# Patient Record
Sex: FEMALE | Race: WHITE | Employment: FULL TIME | URBAN - NONMETROPOLITAN AREA
[De-identification: names, ages, dates, MRNs, and addresses within clinical notes are randomized per-mention and may not be internally consistent; named-entity substitution may affect disease eponyms.]

---

## 2017-02-20 ENCOUNTER — TELEPHONE (OUTPATIENT)
Dept: FAMILY MEDICINE CLINIC | Facility: CLINIC | Age: 37
End: 2017-02-20

## 2017-02-20 NOTE — TELEPHONE ENCOUNTER
Spoke with Enmanuel Garcia at [facility] who wanted to know if citalopram script could be changed to 90 day since dose increase was 3 times daily. Provided v/o to change to 90.  Enmanuel Garcia verified information provided

## 2017-02-21 NOTE — PROGRESS NOTES
Corey Crespo is a 42year old female. Patient presents with:  Medication Follow-Up: discuss medication. . room 5      HPI:   Here to discuss her citalopram dosing. Is working well but recently there have been a lot of issues.   First her father  in 122/80  09/08/16 : 102/60  03/09/16 : 122/78  11/02/15 : 122/64      Wt Readings from Last 6 Encounters:  02/20/17 : 179 lb 4 oz  12/05/16 : 190 lb 6 oz  10/03/16 : 177 lb  09/08/16 : 179 lb 6 oz  03/09/16 : 174 lb 9.6 oz  11/02/15 : 171 lb 2 oz      REVIE anything. Increase in meds can help, time will help           Return in about 4 weeks (around 3/20/2017) for medication review, discussion of dosage adjustment. No orders of the defined types were placed in this encounter.        No orders of the defi

## 2017-02-21 NOTE — PATIENT INSTRUCTIONS
Grief Reaction  Grief is the feeling that we all have when we lose someone or something that has been important in our life. Grief is an unavoidable and normal reaction to this loss, and can last from months to years.  The amount of time depends on differ · Consider joining a grief support group. Ask your doctor or our staff for information on how to find one in your area. · If you have been prescribed a medicine to help with your symptoms, take it only as directed. Do not use it with alcohol.   Follow-up c Maciel Howard will be given to you by the pharmacist with each prescription and refill. Be sure to read this information carefully each time. Talk to your pediatrician regarding the use of this medicine in children. Special care may be needed.   Ricardo · certain medicines for infections like chloroquine, clarithromycin, erythromycin, furazolidone, isoniazid, pentamidine  · certain medicines for migraine headaches like almotriptan, eletriptan, frovatriptan, naratriptan, rizatriptan, sumatriptan, zolmitrip Tell your doctor if your symptoms do not get better or if they get worse. Visit your doctor or health care professional for regular checks on your progress.  Because it may take several weeks to see the full effects of this medicine, it is important to cont

## 2017-04-04 ENCOUNTER — TELEPHONE (OUTPATIENT)
Dept: FAMILY MEDICINE CLINIC | Facility: CLINIC | Age: 37
End: 2017-04-04

## 2017-04-04 DIAGNOSIS — N64.4 BREAST PAIN, LEFT: Primary | ICD-10-CM

## 2017-04-07 ENCOUNTER — HOSPITAL ENCOUNTER (OUTPATIENT)
Dept: ULTRASOUND IMAGING | Age: 37
Discharge: HOME | End: 2017-04-07
Attending: FAMILY MEDICINE
Payer: COMMERCIAL

## 2017-04-07 ENCOUNTER — HOSPITAL ENCOUNTER (OUTPATIENT)
Dept: MAMMOGRAPHY | Age: 37
Discharge: HOME | End: 2017-04-07
Attending: FAMILY MEDICINE
Payer: COMMERCIAL

## 2017-04-07 DIAGNOSIS — N64.4 BREAST PAIN, LEFT: ICD-10-CM

## 2017-04-07 PROCEDURE — 76642 ULTRASOUND BREAST LIMITED: CPT

## 2017-04-07 PROCEDURE — 77062 BREAST TOMOSYNTHESIS BI: CPT

## 2017-04-07 PROCEDURE — 77066 DX MAMMO INCL CAD BI: CPT

## 2017-04-08 NOTE — PATIENT INSTRUCTIONS
Breast Anatomy    Breasts come in all shapes and sizes. But they all share the same features. You can learn about the parts, or anatomy, of your breasts. This will help you know what you're seeing and feeling.  Then you can learn what's normal for your br

## 2017-04-08 NOTE — PROGRESS NOTES
Sean Peraza is a 42year old female. Patient presents with:  Medication Follow-Up: f.up on meds. ...room 6  Breast Problem: LT breast has large dimple in it. Anjali Cunningham HPI:   Patient here for evaluation.   First of all she would like to review her current lb  02/20/17 : 179 lb 4 oz  12/05/16 : 190 lb 6 oz  10/03/16 : 177 lb  09/08/16 : 179 lb 6 oz  03/09/16 : 174 lb 9.6 oz      REVIEW OF SYSTEMS:   GENERAL HEALTH: feels well no complaints  SKIN: denies any unusual skin lesions or rashes  See hpi  MOOD  see ([id number])           Return in about 3 months (around 7/3/2017) for medication review, discussion of dosage adjustment, after results available from testing. No orders of the defined types were placed in this encounter.        No orders of the defined

## 2017-05-24 ENCOUNTER — PATIENT MESSAGE (OUTPATIENT)
Dept: FAMILY MEDICINE CLINIC | Facility: CLINIC | Age: 37
End: 2017-05-24

## 2017-05-24 NOTE — TELEPHONE ENCOUNTER
From: Lia Lu  To: Tammy Feng MD  Sent: 5/24/2017 6:42 AM CDT  Subject: Non-Urgent Medical Question    I'm having continued mild dizzyness. Brain floating sensation like when you've taken too much cold medicine. I'm not taking anything.  Been g

## 2017-05-25 ENCOUNTER — OFFICE VISIT (OUTPATIENT)
Dept: FAMILY MEDICINE CLINIC | Facility: CLINIC | Age: 37
End: 2017-05-25

## 2017-05-25 VITALS
SYSTOLIC BLOOD PRESSURE: 108 MMHG | TEMPERATURE: 99 F | BODY MASS INDEX: 28 KG/M2 | DIASTOLIC BLOOD PRESSURE: 66 MMHG | WEIGHT: 166.13 LBS

## 2017-05-25 DIAGNOSIS — R35.0 URINARY FREQUENCY: Primary | ICD-10-CM

## 2017-05-25 DIAGNOSIS — R42 VERTIGO: ICD-10-CM

## 2017-05-25 PROCEDURE — 81003 URINALYSIS AUTO W/O SCOPE: CPT | Performed by: FAMILY MEDICINE

## 2017-05-25 PROCEDURE — 87086 URINE CULTURE/COLONY COUNT: CPT | Performed by: FAMILY MEDICINE

## 2017-05-25 PROCEDURE — 99213 OFFICE O/P EST LOW 20 MIN: CPT | Performed by: FAMILY MEDICINE

## 2017-05-25 RX ORDER — PREDNISONE 20 MG/1
TABLET ORAL
Qty: 15 TABLET | Refills: 0 | Status: SHIPPED | OUTPATIENT
Start: 2017-05-25 | End: 2018-06-23 | Stop reason: ALTCHOICE

## 2017-05-25 RX ORDER — MECLIZINE HYDROCHLORIDE 25 MG/1
25 TABLET ORAL 3 TIMES DAILY PRN
Qty: 30 TABLET | Refills: 0 | Status: SHIPPED | OUTPATIENT
Start: 2017-05-25 | End: 2018-11-27 | Stop reason: ALTCHOICE

## 2017-05-25 RX ORDER — SULFAMETHOXAZOLE AND TRIMETHOPRIM 800; 160 MG/1; MG/1
1 TABLET ORAL 2 TIMES DAILY
Qty: 10 TABLET | Refills: 0 | Status: SHIPPED | OUTPATIENT
Start: 2017-05-25 | End: 2017-05-30

## 2017-05-26 NOTE — PROGRESS NOTES
Rao Garcia is a 42year old female. Patient presents with:  Dizziness: noticed once a day for 2 weeks. . yesterday pt said it has gotten a lot worse. . pt reports feeling urinary frequency also. .room 6      HPI:   Dizzy recently  States 1 day  Has diz developed, well nourished,in no apparent distress  SKIN: no rashes,no suspicious lesions  HEENT: atraumatic, normocephalic,ears and throat are clear  No nystagmus  Normal exam  NEURO  Vertigo reproduced    NECK: supple,no adenopathy,no bruits  LUNGS: clear Sig: Take 1 tablet by mouth 2 (two) times daily. Meclizine HCl 25 MG Oral Tab 30 tablet 0      Sig: Take 1 tablet (25 mg total) by mouth 3 (three) times daily as needed for Dizziness.       predniSONE 20 MG Oral Tab 15 tablet 0      Si tabs daily 5

## 2017-05-26 NOTE — PATIENT INSTRUCTIONS
Managing Dizziness (Vertigo) with Medicines    Although medicines can't cure your problem, they can help control symptoms. Your doctor may prescribe medicines for a few weeks and then taper them off. Always take your medicine as prescribed.  Never share y

## 2017-06-16 ENCOUNTER — PATIENT MESSAGE (OUTPATIENT)
Dept: FAMILY MEDICINE CLINIC | Facility: CLINIC | Age: 37
End: 2017-06-16

## 2017-06-16 NOTE — TELEPHONE ENCOUNTER
From: Glenn Son  To: Valente Connolly MD  Sent: 6/16/2017 7:13 AM CDT  Subject: Non-Urgent Medical Question    Hi Dr Guevara,  I tried to reorder both of my  Celso's inhalers.  He has no refills and your office left him a message that he needs

## 2018-06-23 ENCOUNTER — TELEPHONE (OUTPATIENT)
Dept: FAMILY MEDICINE CLINIC | Facility: CLINIC | Age: 38
End: 2018-06-23

## 2018-06-23 ENCOUNTER — OFFICE VISIT (OUTPATIENT)
Dept: FAMILY MEDICINE CLINIC | Facility: CLINIC | Age: 38
End: 2018-06-23

## 2018-06-23 ENCOUNTER — HOSPITAL ENCOUNTER (OUTPATIENT)
Dept: CT IMAGING | Facility: HOSPITAL | Age: 38
Discharge: HOME | End: 2018-06-23
Attending: FAMILY MEDICINE
Payer: COMMERCIAL

## 2018-06-23 VITALS
BODY MASS INDEX: 27.49 KG/M2 | SYSTOLIC BLOOD PRESSURE: 108 MMHG | OXYGEN SATURATION: 98 % | WEIGHT: 165 LBS | TEMPERATURE: 99 F | HEIGHT: 65 IN | DIASTOLIC BLOOD PRESSURE: 64 MMHG | HEART RATE: 112 BPM

## 2018-06-23 DIAGNOSIS — R10.31 RLQ ABDOMINAL PAIN: ICD-10-CM

## 2018-06-23 DIAGNOSIS — R10.31 RLQ ABDOMINAL PAIN: Primary | ICD-10-CM

## 2018-06-23 PROCEDURE — 99214 OFFICE O/P EST MOD 30 MIN: CPT | Performed by: FAMILY MEDICINE

## 2018-06-23 PROCEDURE — 82565 ASSAY OF CREATININE: CPT

## 2018-06-23 PROCEDURE — 74177 CT ABD & PELVIS W/CONTRAST: CPT | Performed by: FAMILY MEDICINE

## 2018-06-23 RX ORDER — MULTIVITAMIN
TABLET ORAL
COMMUNITY

## 2018-06-23 NOTE — TELEPHONE ENCOUNTER
Patient had a tortilla w/ cheese an hour ago  Patient had some OJ about 3 hours ago       Called to do pre-cert and [facility] was closed until Monday. Auth is being placed online and will submit clinicals.   Request# [id number]  I will f/up on Monday     Calling to sc

## 2018-06-23 NOTE — PROGRESS NOTES
Vee Lundberg is a 34year old female. Patient presents with: Other: started wednesday until yesterday was having constant pain on right hip pt states near right ovary. Fequent urination.  less frequent BM typically multiple times a day now only 1 ti normal  NECK: supple, no cervical adenopathy  LUNGS: clear to auscultation  CARDIO: RRR without murmur  ABD she has pain to the right lower quadrant and guards to deep palpation over McBurney's point  EXTREMITIES: no edema          ASSESSMENT AND PLAN:

## 2018-11-27 ENCOUNTER — OFFICE VISIT (OUTPATIENT)
Dept: FAMILY MEDICINE CLINIC | Facility: CLINIC | Age: 38
End: 2018-11-27
Payer: COMMERCIAL

## 2018-11-27 VITALS
HEART RATE: 78 BPM | OXYGEN SATURATION: 98 % | DIASTOLIC BLOOD PRESSURE: 60 MMHG | WEIGHT: 169.38 LBS | TEMPERATURE: 99 F | SYSTOLIC BLOOD PRESSURE: 106 MMHG | HEIGHT: 65.25 IN | BODY MASS INDEX: 27.88 KG/M2

## 2018-11-27 DIAGNOSIS — J01.90 ACUTE RHINOSINUSITIS: Primary | ICD-10-CM

## 2018-11-27 DIAGNOSIS — R05.9 COUGH: ICD-10-CM

## 2018-11-27 DIAGNOSIS — F41.9 ANXIETY: ICD-10-CM

## 2018-11-27 PROCEDURE — 99213 OFFICE O/P EST LOW 20 MIN: CPT | Performed by: FAMILY MEDICINE

## 2018-11-27 RX ORDER — PREDNISONE 20 MG/1
TABLET ORAL
Qty: 15 TABLET | Refills: 0 | Status: SHIPPED | OUTPATIENT
Start: 2018-11-27 | End: 2020-01-22 | Stop reason: ALTCHOICE

## 2018-11-27 RX ORDER — CITALOPRAM 20 MG/1
40 TABLET ORAL DAILY
Qty: 30 TABLET | Refills: 1 | Status: SHIPPED | OUTPATIENT
Start: 2018-11-27 | End: 2019-01-26

## 2018-11-27 RX ORDER — AMOXICILLIN AND CLAVULANATE POTASSIUM 875; 125 MG/1; MG/1
1 TABLET, FILM COATED ORAL 2 TIMES DAILY
Qty: 20 TABLET | Refills: 0 | Status: SHIPPED | OUTPATIENT
Start: 2018-11-27 | End: 2018-12-07

## 2018-11-27 NOTE — PROGRESS NOTES
HPI:   Rahel Licea is a 34year old female who presents for upper respiratory symptoms for  5  days.  Patient reports sore throat, congestion, low grade fever, cough with clear colored sputum, cough is keeping pt up at night, chest pain from coughing, well developed, well nourished,in no apparent distress  SKIN: no rashes,no suspicious lesions  EYES:PERRLA, EOMI,conjunctiva are clear  HEENT: atraumatic, normocephalic,ears and throat are clear, mild postnasal drainage  NECK: supple,no adenopathy,no bruit

## 2018-11-27 NOTE — PATIENT INSTRUCTIONS
Amoxicillin; Clavulanic Acid tablets  Brand Name: Augmentin  What is this medicine? AMOXICILLIN; CLAVULANIC ACID (a mox i AISLINN in; SERA rodriguez ic AS id) is a penicillin antibiotic. It is used to treat certain kinds of bacterial infections.  It will not w If you miss a dose, take it as soon as you can. If it is almost time for your next dose, take only that dose. Do not take double or extra doses. Where should I keep my medicine? Keep out of the reach of children.   Store at room temperature below 25 degre

## 2018-12-03 ENCOUNTER — OFFICE VISIT (OUTPATIENT)
Dept: FAMILY MEDICINE CLINIC | Facility: CLINIC | Age: 38
End: 2018-12-03
Payer: COMMERCIAL

## 2018-12-03 VITALS
BODY MASS INDEX: 28.15 KG/M2 | TEMPERATURE: 99 F | OXYGEN SATURATION: 96 % | DIASTOLIC BLOOD PRESSURE: 72 MMHG | SYSTOLIC BLOOD PRESSURE: 120 MMHG | HEART RATE: 80 BPM | HEIGHT: 66.5 IN | WEIGHT: 177.25 LBS

## 2018-12-03 DIAGNOSIS — Z13.220 SCREENING, LIPID: ICD-10-CM

## 2018-12-03 DIAGNOSIS — Z00.00 ROUTINE GENERAL MEDICAL EXAMINATION AT A HEALTH CARE FACILITY: Primary | ICD-10-CM

## 2018-12-03 DIAGNOSIS — Z13.1 SCREENING FOR DIABETES MELLITUS: ICD-10-CM

## 2018-12-03 DIAGNOSIS — F41.9 ANXIETY: ICD-10-CM

## 2018-12-03 DIAGNOSIS — Z13.21 ENCOUNTER FOR VITAMIN DEFICIENCY SCREENING: ICD-10-CM

## 2018-12-03 DIAGNOSIS — Z13.0 SCREENING, ANEMIA, DEFICIENCY, IRON: ICD-10-CM

## 2018-12-03 DIAGNOSIS — Z23 NEED FOR DIPHTHERIA-TETANUS-PERTUSSIS (TDAP) VACCINE: ICD-10-CM

## 2018-12-03 DIAGNOSIS — Z23 NEED FOR INFLUENZA VACCINATION: ICD-10-CM

## 2018-12-03 DIAGNOSIS — Z00.00 LABORATORY EXAM ORDERED AS PART OF ROUTINE GENERAL MEDICAL EXAMINATION: ICD-10-CM

## 2018-12-03 PROCEDURE — 99396 PREV VISIT EST AGE 40-64: CPT | Performed by: FAMILY MEDICINE

## 2018-12-03 NOTE — PROGRESS NOTES
Syed Crooks is a 34year old female.     CC:  Patient presents with:  Physical: room 6    Subjective   HPI:  Feels better  Has been back at work  was   Allergies:    Entex                       Comment:Mouth swollen  Ciprofloxacin               Comme 106/60  06/23/18 : 108/64    REVIEW OF SYSTEMS:   GENERAL: feels well otherwise  SKIN: denies any unusual skin lesions  EYES:denies blurred vision or double vision  HEENT: denies nasal congestion, sinus pain or ST  LUNGS: denies shortness of breath with ex TERESA [facility]vara., [facility]

## 2018-12-03 NOTE — PROGRESS NOTES
Sean Peraza is a 34year old female.     CC:  Patient presents with:  Physical: room 6    Subjective   HPI:  Feels well   But still recovering from sinus infection    Finishing medications    Because of this has decided to postpone her flu shot    Is Yes      Alcohol/week: 7.5 oz      Types: 6 Cans of beer, 9 Shots of liquor per week    Drug use:  No             Immunization History  Administered            Date(s) Administered    >=3 YRS FLUZONE OR FLUARIX QUAD PRESERVE FREE SINGLE DOSE (98258) FLU CLI auscultation  CARDIO: RRR without murmur  GI: good BS's,no masses, HSM or tenderness  : defer  RECTAL:defer  MUSCULOSKELETAL: back is not tender,FROM of the back  EXTREMITIES: no cyanosis, clubbing or edema  NEURO: Oriented times three,cranial nerves are IM USE    Return in about 1 year (around 12/3/2019), or if symptoms worsen or fail to improve, for wellness exam.    Dipika Urrutia M.D., FAAFP

## 2019-01-04 ENCOUNTER — IMMUNIZATION (OUTPATIENT)
Dept: FAMILY MEDICINE CLINIC | Facility: CLINIC | Age: 39
End: 2019-01-04
Payer: COMMERCIAL

## 2019-01-04 ENCOUNTER — LABORATORY ENCOUNTER (OUTPATIENT)
Dept: LAB | Age: 39
End: 2019-01-04
Attending: FAMILY MEDICINE
Payer: COMMERCIAL

## 2019-01-04 DIAGNOSIS — Z13.21 ENCOUNTER FOR VITAMIN DEFICIENCY SCREENING: ICD-10-CM

## 2019-01-04 DIAGNOSIS — Z00.00 LABORATORY EXAM ORDERED AS PART OF ROUTINE GENERAL MEDICAL EXAMINATION: ICD-10-CM

## 2019-01-04 DIAGNOSIS — Z13.220 SCREENING, LIPID: ICD-10-CM

## 2019-01-04 DIAGNOSIS — Z13.0 SCREENING, ANEMIA, DEFICIENCY, IRON: ICD-10-CM

## 2019-01-04 DIAGNOSIS — Z13.1 SCREENING FOR DIABETES MELLITUS: ICD-10-CM

## 2019-01-04 DIAGNOSIS — Z23 NEED FOR VACCINATION: ICD-10-CM

## 2019-01-04 LAB
ALBUMIN SERPL-MCNC: 3.7 G/DL (ref 3.1–4.5)
ALBUMIN/GLOB SERPL: 1.3 {RATIO} (ref 1–2)
ALP LIVER SERPL-CCNC: 78 U/L (ref 37–98)
ALT SERPL-CCNC: 26 U/L (ref 14–54)
ANION GAP SERPL CALC-SCNC: 4 MMOL/L (ref 0–18)
AST SERPL-CCNC: 17 U/L (ref 15–41)
BASOPHILS # BLD AUTO: 0.05 X10(3) UL (ref 0–0.1)
BASOPHILS NFR BLD AUTO: 0.5 %
BILIRUB SERPL-MCNC: 0.6 MG/DL (ref 0.1–2)
BUN BLD-MCNC: 14 MG/DL (ref 8–20)
BUN/CREAT SERPL: 19.2 (ref 10–20)
CALCIUM BLD-MCNC: 8.7 MG/DL (ref 8.3–10.3)
CHLORIDE SERPL-SCNC: 106 MMOL/L (ref 101–111)
CHOLEST SMN-MCNC: 202 MG/DL (ref ?–200)
CO2 SERPL-SCNC: 28 MMOL/L (ref 22–32)
CREAT BLD-MCNC: 0.73 MG/DL (ref 0.55–1.02)
EOSINOPHIL # BLD AUTO: 0.12 X10(3) UL (ref 0–0.3)
EOSINOPHIL NFR BLD AUTO: 1.3 %
ERYTHROCYTE [DISTWIDTH] IN BLOOD BY AUTOMATED COUNT: 13.2 % (ref 11.5–16)
GLOBULIN PLAS-MCNC: 2.9 G/DL (ref 2.8–4.4)
GLUCOSE BLD-MCNC: 92 MG/DL (ref 70–99)
HCT VFR BLD AUTO: 43.6 % (ref 34–50)
HDLC SERPL-MCNC: 57 MG/DL (ref 40–59)
HGB BLD-MCNC: 14.1 G/DL (ref 12–16)
IMMATURE GRANULOCYTE COUNT: 0.06 X10(3) UL (ref 0–1)
IMMATURE GRANULOCYTE RATIO %: 0.6 %
LDLC SERPL CALC-MCNC: 128 MG/DL (ref ?–100)
LYMPHOCYTES # BLD AUTO: 3.08 X10(3) UL (ref 0.9–4)
LYMPHOCYTES NFR BLD AUTO: 32.3 %
M PROTEIN MFR SERPL ELPH: 6.6 G/DL (ref 6.4–8.2)
MCH RBC QN AUTO: 29.1 PG (ref 27–33.2)
MCHC RBC AUTO-ENTMCNC: 32.3 G/DL (ref 31–37)
MCV RBC AUTO: 90.1 FL (ref 81–100)
MONOCYTES # BLD AUTO: 0.69 X10(3) UL (ref 0.1–1)
MONOCYTES NFR BLD AUTO: 7.2 %
NEUTROPHIL ABS PRELIM: 5.54 X10 (3) UL (ref 1.3–6.7)
NEUTROPHILS # BLD AUTO: 5.54 X10(3) UL (ref 1.3–6.7)
NEUTROPHILS NFR BLD AUTO: 58.1 %
NONHDLC SERPL-MCNC: 145 MG/DL (ref ?–130)
OSMOLALITY SERPL CALC.SUM OF ELEC: 286 MOSM/KG (ref 275–295)
PLATELET # BLD AUTO: 367 10(3)UL (ref 150–450)
POTASSIUM SERPL-SCNC: 4 MMOL/L (ref 3.6–5.1)
RBC # BLD AUTO: 4.84 X10(6)UL (ref 3.8–5.1)
RED CELL DISTRIBUTION WIDTH-SD: 43.8 FL (ref 35.1–46.3)
SODIUM SERPL-SCNC: 138 MMOL/L (ref 136–144)
TRIGL SERPL-MCNC: 84 MG/DL (ref 30–149)
VIT D+METAB SERPL-MCNC: 21 NG/ML (ref 30–100)
VLDLC SERPL CALC-MCNC: 17 MG/DL (ref 0–30)
WBC # BLD AUTO: 9.5 X10(3) UL (ref 4–13)

## 2019-01-04 PROCEDURE — 90471 IMMUNIZATION ADMIN: CPT | Performed by: FAMILY MEDICINE

## 2019-01-04 PROCEDURE — 80061 LIPID PANEL: CPT | Performed by: FAMILY MEDICINE

## 2019-01-04 PROCEDURE — 82306 VITAMIN D 25 HYDROXY: CPT | Performed by: FAMILY MEDICINE

## 2019-01-04 PROCEDURE — 36415 COLL VENOUS BLD VENIPUNCTURE: CPT | Performed by: FAMILY MEDICINE

## 2019-01-04 PROCEDURE — 90686 IIV4 VACC NO PRSV 0.5 ML IM: CPT | Performed by: FAMILY MEDICINE

## 2019-01-04 PROCEDURE — 85025 COMPLETE CBC W/AUTO DIFF WBC: CPT | Performed by: FAMILY MEDICINE

## 2019-01-04 PROCEDURE — 80053 COMPREHEN METABOLIC PANEL: CPT | Performed by: FAMILY MEDICINE

## 2020-01-22 ENCOUNTER — OFFICE VISIT (OUTPATIENT)
Dept: FAMILY MEDICINE CLINIC | Facility: CLINIC | Age: 40
End: 2020-01-22
Payer: COMMERCIAL

## 2020-01-22 VITALS
SYSTOLIC BLOOD PRESSURE: 104 MMHG | BODY MASS INDEX: 24.12 KG/M2 | TEMPERATURE: 99 F | WEIGHT: 143 LBS | HEIGHT: 64.75 IN | RESPIRATION RATE: 12 BRPM | DIASTOLIC BLOOD PRESSURE: 60 MMHG | HEART RATE: 88 BPM

## 2020-01-22 DIAGNOSIS — Z00.00 WELL ADULT EXAM: Primary | ICD-10-CM

## 2020-01-22 DIAGNOSIS — Z00.00 LABORATORY EXAMINATION ORDERED AS PART OF A ROUTINE GENERAL MEDICAL EXAMINATION: ICD-10-CM

## 2020-01-22 DIAGNOSIS — Z13.29 SCREENING FOR THYROID DISORDER: ICD-10-CM

## 2020-01-22 DIAGNOSIS — Z23 NEED FOR IMMUNIZATION AGAINST INFLUENZA: ICD-10-CM

## 2020-01-22 DIAGNOSIS — F17.200 TOBACCO DEPENDENCE: ICD-10-CM

## 2020-01-22 DIAGNOSIS — Z13.0 SCREENING, ANEMIA, DEFICIENCY, IRON: ICD-10-CM

## 2020-01-22 DIAGNOSIS — Z13.220 SCREENING, LIPID: ICD-10-CM

## 2020-01-22 DIAGNOSIS — L84 FOOT CALLUS: ICD-10-CM

## 2020-01-22 DIAGNOSIS — R06.83 SNORES: ICD-10-CM

## 2020-01-22 DIAGNOSIS — Z23 NEED FOR DIPHTHERIA-TETANUS-PERTUSSIS (TDAP) VACCINE: ICD-10-CM

## 2020-01-22 DIAGNOSIS — Z23 NEED FOR INFLUENZA VACCINATION: ICD-10-CM

## 2020-01-22 DIAGNOSIS — Z13.1 SCREENING FOR DIABETES MELLITUS: ICD-10-CM

## 2020-01-22 DIAGNOSIS — Z28.21 REFUSES TETANUS, DIPHTHERIA, AND ACELLULAR PERTUSSIS (TDAP) VACCINATION: ICD-10-CM

## 2020-01-22 PROCEDURE — 99396 PREV VISIT EST AGE 40-64: CPT | Performed by: FAMILY MEDICINE

## 2020-01-22 PROCEDURE — 90686 IIV4 VACC NO PRSV 0.5 ML IM: CPT | Performed by: FAMILY MEDICINE

## 2020-01-22 PROCEDURE — 90471 IMMUNIZATION ADMIN: CPT | Performed by: FAMILY MEDICINE

## 2020-01-22 NOTE — PROGRESS NOTES
Corey Crespo is a 37year old female. CC:  Patient presents with:  CPX: inrm.  5     Chief Complaint Reviewed and Verified  Nursing Notes Reviewed and   Verified  Tobacco Reviewed  Allergies Reviewed  Medications Reviewed    Problem List Reviewed reviewed. No pertinent past medical history.    Past Surgical History:   Procedure Laterality Date   • TONSILLECTOMY        Family History   Problem Relation Age of Onset   • Breast Cancer Other    • Genetic Disease Other    • Heart Disorder Other    • Canc otherwise  SKIN: denies any unusual skin lesions  EYES:denies blurred vision or double vision  HEENT: denies nasal congestion, sinus pain or ST  LUNGS: denies shortness of breath with exertion  CARDIOVASCULAR: denies chest pain on exertion  GI: denies abdo Relevant Orders    FLULAVAL INFLUENZA VACCINE QUAD PRESERVATIVE FREE 0.5 ML (Completed)    Screening for diabetes mellitus        Relevant Orders    COMP METABOLIC PANEL (14)    Laboratory examination ordered as part of a routine general medical examinatio

## 2020-01-24 ENCOUNTER — TELEPHONE (OUTPATIENT)
Dept: FAMILY MEDICINE CLINIC | Facility: CLINIC | Age: 40
End: 2020-01-24

## 2020-01-24 DIAGNOSIS — R06.83 SNORING: Primary | ICD-10-CM

## 2020-01-24 NOTE — TELEPHONE ENCOUNTER
Spoke with patient who states she would like to know if Dr. Rachel Nicholson would order a home sleep study, as she feels the outpatient sleep study will not be accurate.  They start at 9:00pm, and she goes to bed at 7:30pm. Patient aware that Dr. Rachel Nicholson is out of

## 2020-01-24 NOTE — TELEPHONE ENCOUNTER
Tiburcio Truong would like to know if Dr Hannah Sykes would order a home sleep study?   She called to get it set up today but they don't start until 9 pm and Tiburcio Truong goes to bed by 7:30pm.  Please advise

## 2020-01-25 NOTE — TELEPHONE ENCOUNTER
Home sleep study ordered    Patient notified and verbalized understanding of the information provided

## 2020-02-27 ENCOUNTER — OFFICE VISIT (OUTPATIENT)
Dept: SLEEP CENTER | Age: 40
End: 2020-02-27
Attending: FAMILY MEDICINE
Payer: COMMERCIAL

## 2020-02-27 DIAGNOSIS — R06.83 SNORING: ICD-10-CM

## 2020-02-27 PROCEDURE — 95806 SLEEP STUDY UNATT&RESP EFFT: CPT

## 2020-02-29 ENCOUNTER — PATIENT MESSAGE (OUTPATIENT)
Dept: FAMILY MEDICINE CLINIC | Facility: CLINIC | Age: 40
End: 2020-02-29

## 2020-03-02 NOTE — TELEPHONE ENCOUNTER
From: Storm Cole  To: Sherley Delaney MD  Sent: 2/29/2020 3:01 PM CST  Subject: Non-Urgent Medical Question    Is it possible to get my tetanus shot when I come in for my labs 3/14 or do I need another appointment?

## 2020-03-09 ENCOUNTER — SLEEP STUDY (OUTPATIENT)
Dept: FAMILY MEDICINE CLINIC | Facility: CLINIC | Age: 40
End: 2020-03-09
Payer: COMMERCIAL

## 2020-03-09 DIAGNOSIS — G47.33 OSA (OBSTRUCTIVE SLEEP APNEA): Primary | ICD-10-CM

## 2020-03-09 PROCEDURE — 95806 SLEEP STUDY UNATT&RESP EFFT: CPT | Performed by: FAMILY MEDICINE

## 2020-03-14 ENCOUNTER — TELEPHONE (OUTPATIENT)
Dept: FAMILY MEDICINE CLINIC | Facility: CLINIC | Age: 40
End: 2020-03-14

## 2020-03-14 ENCOUNTER — NURSE ONLY (OUTPATIENT)
Dept: FAMILY MEDICINE CLINIC | Facility: CLINIC | Age: 40
End: 2020-03-14
Payer: COMMERCIAL

## 2020-03-14 DIAGNOSIS — Z13.1 SCREENING FOR DIABETES MELLITUS: ICD-10-CM

## 2020-03-14 DIAGNOSIS — Z13.29 SCREENING FOR THYROID DISORDER: ICD-10-CM

## 2020-03-14 DIAGNOSIS — Z00.00 LABORATORY EXAMINATION ORDERED AS PART OF A ROUTINE GENERAL MEDICAL EXAMINATION: ICD-10-CM

## 2020-03-14 DIAGNOSIS — F41.9 ANXIETY: ICD-10-CM

## 2020-03-14 DIAGNOSIS — Z13.21 ENCOUNTER FOR VITAMIN DEFICIENCY SCREENING: Primary | ICD-10-CM

## 2020-03-14 DIAGNOSIS — G47.33 MILD OBSTRUCTIVE SLEEP APNEA: ICD-10-CM

## 2020-03-14 DIAGNOSIS — Z13.0 SCREENING, ANEMIA, DEFICIENCY, IRON: ICD-10-CM

## 2020-03-14 DIAGNOSIS — R06.83 SNORING: Primary | ICD-10-CM

## 2020-03-14 DIAGNOSIS — Z13.220 SCREENING, LIPID: ICD-10-CM

## 2020-03-14 LAB
ALBUMIN SERPL-MCNC: 3.5 G/DL (ref 3.4–5)
ALBUMIN/GLOB SERPL: 1.2 {RATIO} (ref 1–2)
ALP LIVER SERPL-CCNC: 77 U/L (ref 37–98)
ALT SERPL-CCNC: 66 U/L (ref 13–56)
ANION GAP SERPL CALC-SCNC: 5 MMOL/L (ref 0–18)
AST SERPL-CCNC: 33 U/L (ref 15–37)
BASOPHILS # BLD AUTO: 0.08 X10(3) UL (ref 0–0.2)
BASOPHILS NFR BLD AUTO: 0.6 %
BILIRUB SERPL-MCNC: 0.5 MG/DL (ref 0.1–2)
BUN BLD-MCNC: 22 MG/DL (ref 7–18)
BUN/CREAT SERPL: 29.3 (ref 10–20)
CALCIUM BLD-MCNC: 9 MG/DL (ref 8.5–10.1)
CHLORIDE SERPL-SCNC: 113 MMOL/L (ref 98–112)
CHOLEST SMN-MCNC: 150 MG/DL (ref ?–200)
CO2 SERPL-SCNC: 22 MMOL/L (ref 21–32)
CREAT BLD-MCNC: 0.75 MG/DL (ref 0.55–1.02)
DEPRECATED RDW RBC AUTO: 43.9 FL (ref 35.1–46.3)
EOSINOPHIL # BLD AUTO: 0.2 X10(3) UL (ref 0–0.7)
EOSINOPHIL NFR BLD AUTO: 1.4 %
ERYTHROCYTE [DISTWIDTH] IN BLOOD BY AUTOMATED COUNT: 13.4 % (ref 11–15)
GLOBULIN PLAS-MCNC: 3 G/DL (ref 2.8–4.4)
GLUCOSE BLD-MCNC: 88 MG/DL (ref 70–99)
HCT VFR BLD AUTO: 42.4 % (ref 35–48)
HDLC SERPL-MCNC: 59 MG/DL (ref 40–59)
HGB BLD-MCNC: 13.6 G/DL (ref 12–16)
IMM GRANULOCYTES # BLD AUTO: 0.08 X10(3) UL (ref 0–1)
IMM GRANULOCYTES NFR BLD: 0.6 %
LDLC SERPL CALC-MCNC: 85 MG/DL (ref ?–100)
LYMPHOCYTES # BLD AUTO: 2.66 X10(3) UL (ref 1–4)
LYMPHOCYTES NFR BLD AUTO: 18.4 %
M PROTEIN MFR SERPL ELPH: 6.5 G/DL (ref 6.4–8.2)
MCH RBC QN AUTO: 28.8 PG (ref 26–34)
MCHC RBC AUTO-ENTMCNC: 32.1 G/DL (ref 31–37)
MCV RBC AUTO: 89.8 FL (ref 80–100)
MONOCYTES # BLD AUTO: 0.62 X10(3) UL (ref 0.1–1)
MONOCYTES NFR BLD AUTO: 4.3 %
NEUTROPHILS # BLD AUTO: 10.84 X10 (3) UL (ref 1.5–7.7)
NEUTROPHILS # BLD AUTO: 10.84 X10(3) UL (ref 1.5–7.7)
NEUTROPHILS NFR BLD AUTO: 74.7 %
NONHDLC SERPL-MCNC: 91 MG/DL (ref ?–130)
OSMOLALITY SERPL CALC.SUM OF ELEC: 293 MOSM/KG (ref 275–295)
PATIENT FASTING Y/N/NP: YES
PATIENT FASTING Y/N/NP: YES
PLATELET # BLD AUTO: 344 10(3)UL (ref 150–450)
POTASSIUM SERPL-SCNC: 4.6 MMOL/L (ref 3.5–5.1)
RBC # BLD AUTO: 4.72 X10(6)UL (ref 3.8–5.3)
SODIUM SERPL-SCNC: 140 MMOL/L (ref 136–145)
TRIGL SERPL-MCNC: 29 MG/DL (ref 30–149)
TSI SER-ACNC: 2.08 MIU/ML (ref 0.36–3.74)
VIT D+METAB SERPL-MCNC: 37.8 NG/ML (ref 30–100)
VLDLC SERPL CALC-MCNC: 6 MG/DL (ref 0–30)
WBC # BLD AUTO: 14.5 X10(3) UL (ref 4–11)

## 2020-03-14 PROCEDURE — 82306 VITAMIN D 25 HYDROXY: CPT | Performed by: FAMILY MEDICINE

## 2020-03-14 PROCEDURE — 80061 LIPID PANEL: CPT | Performed by: FAMILY MEDICINE

## 2020-03-14 PROCEDURE — 36415 COLL VENOUS BLD VENIPUNCTURE: CPT | Performed by: FAMILY MEDICINE

## 2020-03-14 PROCEDURE — 80050 GENERAL HEALTH PANEL: CPT | Performed by: FAMILY MEDICINE

## 2020-03-14 PROCEDURE — 90715 TDAP VACCINE 7 YRS/> IM: CPT | Performed by: FAMILY MEDICINE

## 2020-03-14 PROCEDURE — 90471 IMMUNIZATION ADMIN: CPT | Performed by: FAMILY MEDICINE

## 2020-03-14 NOTE — TELEPHONE ENCOUNTER
Call patient with the study results. Informed her that her AHI did come out to 4.9 which would probably indicate with an in lab being more accurate that patient has mild sleep apnea.    Patient states that she had the study secondary to waking up gasping f

## 2020-03-14 NOTE — TELEPHONE ENCOUNTER
Called patient re: her home sleep study. Is very upset that she got the bill before she got the results. States that she is very upset right now and would like a call back in an hour.

## 2020-03-14 NOTE — PROCEDURES
[address]       Accredited by the [facility] ([facility]ASM)    PATIENT'S NAME:        Tomás Dahlia  ATTENDING PHYSICIAN:   Lary Castillo MD  REFERRING PHYSICIAN:   Melissa Gonzalez M.D.   PHILIP polysomnogram.   2.   Obtain and maintain an optimal BMI. 3.   Follow up with sleep specialist 2 weeks after starting CPAP/BiPAP therapy in order to determine effectiveness and compliance. 4.   Attention to sleep hygiene.   5.   Evaluate for other cardia

## 2020-10-27 ENCOUNTER — TELEPHONE (OUTPATIENT)
Dept: FAMILY MEDICINE CLINIC | Facility: CLINIC | Age: 40
End: 2020-10-27

## 2020-10-27 DIAGNOSIS — Z20.822 EXPOSURE TO COVID-19 VIRUS: Primary | ICD-10-CM

## 2020-10-27 NOTE — TELEPHONE ENCOUNTER
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

## 2020-10-27 NOTE — TELEPHONE ENCOUNTER
Someone who was sick toured the job site on Friday, patient was told on Monday someone has tested and employer requires covid test. Asymptomatic.

## 2020-10-28 ENCOUNTER — APPOINTMENT (OUTPATIENT)
Dept: LAB | Age: 40
End: 2020-10-28
Attending: FAMILY MEDICINE
Payer: COMMERCIAL

## 2020-10-28 DIAGNOSIS — Z20.822 EXPOSURE TO COVID-19 VIRUS: ICD-10-CM

## 2020-10-28 PROCEDURE — U0003 INFECTIOUS AGENT DETECTION BY NUCLEIC ACID (DNA OR RNA); SEVERE ACUTE RESPIRATORY SYNDROME CORONAVIRUS 2 (SARS-COV-2) (CORONAVIRUS DISEASE [COVID-19]), AMPLIFIED PROBE TECHNIQUE, MAKING USE OF HIGH THROUGHPUT TECHNOLOGIES AS DESCRIBED BY CMS-2020-01-R: HCPCS | Performed by: FAMILY MEDICINE

## 2020-10-31 ENCOUNTER — TELEPHONE (OUTPATIENT)
Dept: FAMILY MEDICINE CLINIC | Facility: CLINIC | Age: 40
End: 2020-10-31

## 2021-03-16 ENCOUNTER — TELEPHONE (OUTPATIENT)
Dept: FAMILY MEDICINE CLINIC | Facility: CLINIC | Age: 41
End: 2021-03-16

## 2021-03-16 ENCOUNTER — HOSPITAL ENCOUNTER (OUTPATIENT)
Age: 41
Discharge: HOME | End: 2021-03-16
Payer: COMMERCIAL

## 2021-03-16 VITALS
DIASTOLIC BLOOD PRESSURE: 69 MMHG | BODY MASS INDEX: 22.16 KG/M2 | SYSTOLIC BLOOD PRESSURE: 124 MMHG | RESPIRATION RATE: 16 BRPM | TEMPERATURE: 99 F | HEIGHT: 65 IN | OXYGEN SATURATION: 98 % | WEIGHT: 133 LBS | HEART RATE: 83 BPM

## 2021-03-16 DIAGNOSIS — B34.9 VIRAL SYNDROME: Primary | ICD-10-CM

## 2021-03-16 LAB — SARS-COV-2 RNA RESP QL NAA+PROBE: NOT DETECTED

## 2021-03-16 PROCEDURE — U0002 COVID-19 LAB TEST NON-CDC: HCPCS | Performed by: PHYSICIAN ASSISTANT

## 2021-03-16 PROCEDURE — 99213 OFFICE O/P EST LOW 20 MIN: CPT | Performed by: PHYSICIAN ASSISTANT

## 2021-03-16 NOTE — ED PROVIDER NOTES
Patient Seen in: [facility] [address] [place] [address]      History   Patient presents with:  Cough/URI    Stated Complaint: cough/sneezing    HPI/Subjective:   HPI    43-year-old female presents to the immediate care for Covid testing.   Patient reports body aches, coug normal.   Cardiovascular:      Rate and Rhythm: Normal rate and regular rhythm. Pulmonary:      Effort: Pulmonary effort is normal.      Breath sounds: Normal breath sounds. Musculoskeletal:      Cervical back: Normal range of motion and neck supple.

## 2021-03-16 NOTE — ED INITIAL ASSESSMENT (HPI)
Pt presents to the IC with complaints of sore throat, body aches, sinus pressure this morning. Pt has had no known exposure to covid. Taking po well. Pt denies fever. Pt awake and alert,skin w/d,resps reg/unlabored.

## 2021-03-19 ENCOUNTER — HOSPITAL ENCOUNTER (OUTPATIENT)
Age: 41
Discharge: HOME | End: 2021-03-19
Payer: COMMERCIAL

## 2021-03-19 VITALS
DIASTOLIC BLOOD PRESSURE: 63 MMHG | OXYGEN SATURATION: 100 % | HEART RATE: 98 BPM | RESPIRATION RATE: 18 BRPM | SYSTOLIC BLOOD PRESSURE: 124 MMHG | TEMPERATURE: 98 F

## 2021-03-19 DIAGNOSIS — Z20.822 ENCOUNTER FOR SCREENING LABORATORY TESTING FOR COVID-19 VIRUS IN ASYMPTOMATIC PATIENT: Primary | ICD-10-CM

## 2021-03-19 LAB — SARS-COV-2 RNA RESP QL NAA+PROBE: NOT DETECTED

## 2021-03-19 PROCEDURE — U0002 COVID-19 LAB TEST NON-CDC: HCPCS | Performed by: NURSE PRACTITIONER

## 2021-03-19 PROCEDURE — 99212 OFFICE O/P EST SF 10 MIN: CPT | Performed by: NURSE PRACTITIONER

## 2021-03-19 NOTE — ED PROVIDER NOTES
Patient Seen in: [facility] [address] [facility]      History   Patient presents with:  Testing    Stated Complaint: covid testing    HPI/Subjective:   49-year-old female presents to the IC requesting a COVID-19 test.  Patient is requesting a test so she can go se The patient is well-developed well-nourished nontoxic, non-ill-appearing  HEENT: Normocephalic. Atraumatic. Extraocular motions are intact  NECK: Supple. No meningitic signs are noted. CHEST/LUNGS: Clear to auscultation.   There is no respiratory distr

## 2021-03-21 ENCOUNTER — IMMUNIZATION (OUTPATIENT)
Dept: LAB | Facility: HOSPITAL | Age: 41
End: 2021-03-21
Attending: HOSPITALIST
Payer: COMMERCIAL

## 2021-03-21 DIAGNOSIS — Z23 NEED FOR VACCINATION: Primary | ICD-10-CM

## 2021-03-21 PROCEDURE — 0011A SARSCOV2 VAC 100MCG/0.5ML IM: CPT

## 2021-04-18 ENCOUNTER — IMMUNIZATION (OUTPATIENT)
Dept: LAB | Facility: HOSPITAL | Age: 41
End: 2021-04-18
Attending: EMERGENCY MEDICINE
Payer: COMMERCIAL

## 2021-04-18 DIAGNOSIS — Z23 NEED FOR VACCINATION: Primary | ICD-10-CM

## 2021-04-18 PROCEDURE — 0012A SARSCOV2 VAC 100MCG/0.5ML IM: CPT

## 2021-10-07 ENCOUNTER — PATIENT MESSAGE (OUTPATIENT)
Dept: FAMILY MEDICINE CLINIC | Facility: CLINIC | Age: 41
End: 2021-10-07

## 2021-10-07 NOTE — TELEPHONE ENCOUNTER
From: Aime Orta  To: Xiao Castellano MD  Sent: 10/7/2021 7:13 AM CDT  Subject: Need covid rapid testing    Good morning,  I was off work yesterday for possible food poisoning. Extreme vomiting and diarrhea for about 12 hours. no fever.    I can't retur

## 2022-03-28 ENCOUNTER — OFFICE VISIT (OUTPATIENT)
Dept: FAMILY MEDICINE CLINIC | Facility: CLINIC | Age: 42
End: 2022-03-28
Payer: COMMERCIAL

## 2022-03-28 VITALS
SYSTOLIC BLOOD PRESSURE: 120 MMHG | RESPIRATION RATE: 16 BRPM | HEIGHT: 65 IN | DIASTOLIC BLOOD PRESSURE: 66 MMHG | BODY MASS INDEX: 24.99 KG/M2 | TEMPERATURE: 98 F | WEIGHT: 150 LBS | OXYGEN SATURATION: 97 % | HEART RATE: 86 BPM

## 2022-03-28 DIAGNOSIS — R39.9 UTI SYMPTOMS: Primary | ICD-10-CM

## 2022-03-28 LAB
BILIRUBIN: NEGATIVE
GLUCOSE (URINE DIPSTICK): NEGATIVE MG/DL
KETONES (URINE DIPSTICK): NEGATIVE MG/DL
MULTISTIX LOT#: ABNORMAL NUMERIC
NITRITE, URINE: POSITIVE
PH, URINE: 6 (ref 4.5–8)
PROTEIN (URINE DIPSTICK): >=300 MG/DL
SPECIFIC GRAVITY: 1.03 (ref 1–1.03)
UROBILINOGEN,SEMI-QN: 0.2 MG/DL (ref 0–1.9)

## 2022-03-28 PROCEDURE — 3078F DIAST BP <80 MM HG: CPT | Performed by: PHYSICIAN ASSISTANT

## 2022-03-28 PROCEDURE — 3008F BODY MASS INDEX DOCD: CPT | Performed by: PHYSICIAN ASSISTANT

## 2022-03-28 PROCEDURE — 87077 CULTURE AEROBIC IDENTIFY: CPT | Performed by: PHYSICIAN ASSISTANT

## 2022-03-28 PROCEDURE — 3074F SYST BP LT 130 MM HG: CPT | Performed by: PHYSICIAN ASSISTANT

## 2022-03-28 PROCEDURE — 87186 SC STD MICRODIL/AGAR DIL: CPT | Performed by: PHYSICIAN ASSISTANT

## 2022-03-28 PROCEDURE — 81003 URINALYSIS AUTO W/O SCOPE: CPT | Performed by: PHYSICIAN ASSISTANT

## 2022-03-28 PROCEDURE — 87086 URINE CULTURE/COLONY COUNT: CPT | Performed by: PHYSICIAN ASSISTANT

## 2022-03-28 PROCEDURE — 99213 OFFICE O/P EST LOW 20 MIN: CPT | Performed by: PHYSICIAN ASSISTANT

## 2022-03-28 RX ORDER — NITROFURANTOIN 25; 75 MG/1; MG/1
100 CAPSULE ORAL 2 TIMES DAILY
Qty: 14 CAPSULE | Refills: 0 | Status: SHIPPED | OUTPATIENT
Start: 2022-03-28 | End: 2022-04-04

## 2022-04-04 ENCOUNTER — OFFICE VISIT (OUTPATIENT)
Dept: FAMILY MEDICINE CLINIC | Facility: CLINIC | Age: 42
End: 2022-04-04
Payer: COMMERCIAL

## 2022-04-04 VITALS
RESPIRATION RATE: 12 BRPM | WEIGHT: 143 LBS | HEART RATE: 62 BPM | BODY MASS INDEX: 23.82 KG/M2 | TEMPERATURE: 98 F | HEIGHT: 65 IN | OXYGEN SATURATION: 98 % | SYSTOLIC BLOOD PRESSURE: 126 MMHG | DIASTOLIC BLOOD PRESSURE: 80 MMHG

## 2022-04-04 DIAGNOSIS — K92.0 HEMATEMESIS WITH NAUSEA: Primary | ICD-10-CM

## 2022-04-04 DIAGNOSIS — Z72.89 ALCOHOL USE: ICD-10-CM

## 2022-04-04 DIAGNOSIS — R39.9 UTI SYMPTOMS: ICD-10-CM

## 2022-04-04 DIAGNOSIS — B36.0 TINEA VERSICOLOR: ICD-10-CM

## 2022-04-04 PROCEDURE — 99214 OFFICE O/P EST MOD 30 MIN: CPT | Performed by: FAMILY MEDICINE

## 2022-04-04 PROCEDURE — 3074F SYST BP LT 130 MM HG: CPT | Performed by: FAMILY MEDICINE

## 2022-04-04 PROCEDURE — 3079F DIAST BP 80-89 MM HG: CPT | Performed by: FAMILY MEDICINE

## 2022-04-04 PROCEDURE — 3008F BODY MASS INDEX DOCD: CPT | Performed by: FAMILY MEDICINE

## 2022-04-04 RX ORDER — KETOCONAZOLE 20 MG/G
1 CREAM TOPICAL DAILY
Qty: 30 G | Refills: 1 | Status: SHIPPED | OUTPATIENT
Start: 2022-04-04

## 2022-04-04 RX ORDER — PANTOPRAZOLE SODIUM 40 MG/1
40 TABLET, DELAYED RELEASE ORAL DAILY
COMMUNITY
Start: 2022-04-02

## 2022-04-22 ENCOUNTER — PATIENT MESSAGE (OUTPATIENT)
Dept: FAMILY MEDICINE CLINIC | Facility: CLINIC | Age: 42
End: 2022-04-22

## 2022-04-22 RX ORDER — NITROFURANTOIN 25; 75 MG/1; MG/1
100 CAPSULE ORAL 2 TIMES DAILY
Qty: 14 CAPSULE | Refills: 0 | Status: SHIPPED | OUTPATIENT
Start: 2022-04-22 | End: 2022-04-29

## 2022-04-22 NOTE — TELEPHONE ENCOUNTER
From: Miguel Motta  To: Andrew Mcdonough MD  Sent: 4/22/2022 7:31 AM CDT  Subject: Do I need to come back in? As we discussed might be a possibility with the short course of antibiotics I was on 2 weeks ago was insufficient. UTI flared back up. I have frequent urgency to urinate which is then bloody and painful. Has too much time passed for you to call in that longer course you offered or should I try for an appointment with you or the walk-in clinic?   Thanks

## 2022-05-14 ENCOUNTER — LAB ENCOUNTER (OUTPATIENT)
Dept: LAB | Age: 42
End: 2022-05-14
Attending: INTERNAL MEDICINE
Payer: COMMERCIAL

## 2022-05-14 DIAGNOSIS — Z01.818 PRE-OP TESTING: ICD-10-CM

## 2022-05-14 LAB — SARS-COV-2 RNA RESP QL NAA+PROBE: NOT DETECTED

## 2022-05-17 PROBLEM — K92.0 HEMATEMESIS: Status: ACTIVE | Noted: 2022-05-17

## 2023-05-13 ENCOUNTER — OFFICE VISIT (OUTPATIENT)
Dept: FAMILY MEDICINE CLINIC | Facility: CLINIC | Age: 43
End: 2023-05-13
Payer: COMMERCIAL

## 2023-05-13 VITALS
TEMPERATURE: 99 F | HEIGHT: 65 IN | WEIGHT: 145 LBS | HEART RATE: 108 BPM | SYSTOLIC BLOOD PRESSURE: 133 MMHG | RESPIRATION RATE: 18 BRPM | BODY MASS INDEX: 24.16 KG/M2 | DIASTOLIC BLOOD PRESSURE: 88 MMHG

## 2023-05-13 DIAGNOSIS — H00.011 HORDEOLUM OF RIGHT UPPER EYELID, UNSPECIFIED HORDEOLUM TYPE: Primary | ICD-10-CM

## 2023-05-13 DIAGNOSIS — L24.89 IRRITANT CONTACT DERMATITIS DUE TO OTHER AGENTS: ICD-10-CM

## 2023-05-13 PROCEDURE — 3079F DIAST BP 80-89 MM HG: CPT | Performed by: PHYSICIAN ASSISTANT

## 2023-05-13 PROCEDURE — 3008F BODY MASS INDEX DOCD: CPT | Performed by: PHYSICIAN ASSISTANT

## 2023-05-13 PROCEDURE — 99213 OFFICE O/P EST LOW 20 MIN: CPT | Performed by: PHYSICIAN ASSISTANT

## 2023-05-13 PROCEDURE — 3075F SYST BP GE 130 - 139MM HG: CPT | Performed by: PHYSICIAN ASSISTANT

## 2023-05-13 RX ORDER — CEPHALEXIN 500 MG/1
500 CAPSULE ORAL 3 TIMES DAILY
Qty: 21 CAPSULE | Refills: 0 | Status: SHIPPED | OUTPATIENT
Start: 2023-05-13 | End: 2023-05-20

## 2023-05-13 RX ORDER — ERYTHROMYCIN 5 MG/G
OINTMENT OPHTHALMIC
Qty: 3.5 G | Refills: 0 | Status: SHIPPED | OUTPATIENT
Start: 2023-05-13 | End: 2023-05-23

## 2024-03-09 ENCOUNTER — OFFICE VISIT (OUTPATIENT)
Dept: FAMILY MEDICINE CLINIC | Facility: CLINIC | Age: 44
End: 2024-03-09
Payer: COMMERCIAL

## 2024-03-09 VITALS
HEIGHT: 65 IN | BODY MASS INDEX: 24.16 KG/M2 | SYSTOLIC BLOOD PRESSURE: 108 MMHG | HEART RATE: 100 BPM | WEIGHT: 145 LBS | TEMPERATURE: 100 F | DIASTOLIC BLOOD PRESSURE: 78 MMHG | OXYGEN SATURATION: 97 % | RESPIRATION RATE: 18 BRPM

## 2024-03-09 DIAGNOSIS — J02.9 SORE THROAT: Primary | ICD-10-CM

## 2024-03-09 LAB
CONTROL LINE PRESENT WITH A CLEAR BACKGROUND (YES/NO): YES YES/NO
KIT LOT #: NORMAL NUMERIC
OPERATOR ID: NORMAL
POCT LOT NUMBER: NORMAL
RAPID SARS-COV-2 BY PCR: NOT DETECTED
STREP GRP A CUL-SCR: NEGATIVE

## 2024-03-09 PROCEDURE — 3074F SYST BP LT 130 MM HG: CPT | Performed by: PHYSICIAN ASSISTANT

## 2024-03-09 PROCEDURE — U0002 COVID-19 LAB TEST NON-CDC: HCPCS | Performed by: PHYSICIAN ASSISTANT

## 2024-03-09 PROCEDURE — 87880 STREP A ASSAY W/OPTIC: CPT | Performed by: PHYSICIAN ASSISTANT

## 2024-03-09 PROCEDURE — 99213 OFFICE O/P EST LOW 20 MIN: CPT | Performed by: PHYSICIAN ASSISTANT

## 2024-03-09 PROCEDURE — 3078F DIAST BP <80 MM HG: CPT | Performed by: PHYSICIAN ASSISTANT

## 2024-03-09 PROCEDURE — 3008F BODY MASS INDEX DOCD: CPT | Performed by: PHYSICIAN ASSISTANT

## 2024-03-09 RX ORDER — OSELTAMIVIR PHOSPHATE 75 MG/1
75 CAPSULE ORAL 2 TIMES DAILY
Qty: 10 CAPSULE | Refills: 0 | Status: SHIPPED | OUTPATIENT
Start: 2024-03-09 | End: 2024-03-14

## 2024-03-09 RX ORDER — BENZONATATE 200 MG/1
200 CAPSULE ORAL 3 TIMES DAILY PRN
Qty: 30 CAPSULE | Refills: 0 | Status: SHIPPED | OUTPATIENT
Start: 2024-03-09

## 2024-07-31 ENCOUNTER — TELEPHONE (OUTPATIENT)
Dept: FAMILY MEDICINE CLINIC | Facility: CLINIC | Age: 44
End: 2024-07-31

## 2024-09-17 ENCOUNTER — TELEPHONE (OUTPATIENT)
Dept: FAMILY MEDICINE CLINIC | Facility: CLINIC | Age: 44
End: 2024-09-17

## 2024-12-17 ENCOUNTER — OFFICE VISIT (OUTPATIENT)
Dept: FAMILY MEDICINE CLINIC | Facility: CLINIC | Age: 44
End: 2024-12-17
Payer: COMMERCIAL

## 2024-12-17 VITALS
HEART RATE: 85 BPM | BODY MASS INDEX: 24.66 KG/M2 | DIASTOLIC BLOOD PRESSURE: 76 MMHG | RESPIRATION RATE: 18 BRPM | TEMPERATURE: 99 F | WEIGHT: 148 LBS | SYSTOLIC BLOOD PRESSURE: 133 MMHG | HEIGHT: 65 IN | OXYGEN SATURATION: 97 %

## 2024-12-17 DIAGNOSIS — J01.00 ACUTE NON-RECURRENT MAXILLARY SINUSITIS: Primary | ICD-10-CM

## 2024-12-17 PROCEDURE — 99213 OFFICE O/P EST LOW 20 MIN: CPT | Performed by: PHYSICIAN ASSISTANT

## 2024-12-17 PROCEDURE — 3008F BODY MASS INDEX DOCD: CPT | Performed by: PHYSICIAN ASSISTANT

## 2024-12-17 PROCEDURE — 3078F DIAST BP <80 MM HG: CPT | Performed by: PHYSICIAN ASSISTANT

## 2024-12-17 PROCEDURE — 3075F SYST BP GE 130 - 139MM HG: CPT | Performed by: PHYSICIAN ASSISTANT

## 2024-12-17 NOTE — PROGRESS NOTES
CHIEF COMPLAINT:     Chief Complaint   Patient presents with    Sinus Problem     Sinus pain and congestion. Symptoms for a week   OTC: Mucinex Dm        HPI:   Gabriela Rowland is a 44 year old female who presents with sinus pain and pressure for just over a week.  Pain her teeth and ears feel full and crackly with intermittens shooting pain.  Now getting some cough too.       Associated symptoms:    Fever/Chills  No  Sore throat  Yes  Cough  Yes   Congestion Yes  Bodyache  No  Headache  Yes  Chest pain No  SOB/Dyspnea No  Loss of taste No  Loss of smell No  Diarrhea No  Vomiting No    Had a negative home covid test.     Using sinex nasal spray        Current Outpatient Medications   Medication Sig Dispense Refill    amoxicillin clavulanate 875-125 MG Oral Tab Take 1 tablet by mouth 2 (two) times daily for 10 days. 20 tablet 0    benzonatate 200 MG Oral Cap Take 1 capsule (200 mg total) by mouth 3 (three) times daily as needed for cough. 30 capsule 0    benzonatate 200 MG Oral Cap Take 1 capsule (200 mg total) by mouth 3 (three) times daily as needed for cough. 30 capsule 0    ketoconazole 2 % External Cream Apply 1 Application topically daily. (Patient not taking: Reported on 3/9/2024) 30 g 1    Cholecalciferol 5000 units Oral Tab Take 1 tablet (5,000 Units total) by mouth daily. (Patient not taking: Reported on 5/13/2023) 1 tablet 0      Past Medical History:    Abdominal pain    Something usually hurts in there    Anxiety    Blood in urine    Calculus of kidney    Feeling lonely    Flatulence/gas pain/belching    Lifelong in excess    Frequent urination    Frequent UTI    History of depression    Menses painful    Night sweats    2 months roughly    Pain in joints    Painful swallowing    Painful urination    Sleep disturbance    3 months    Stress    Vomiting blood    Just the 1 time    Wears glasses      Social History:  Social History     Socioeconomic History    Marital status:    Tobacco Use     Smoking status: Every Day     Current packs/day: 0.50     Average packs/day: 0.5 packs/day for 20.0 years (10.0 ttl pk-yrs)     Types: Cigarettes    Smokeless tobacco: Never   Vaping Use    Vaping status: Never Used   Substance and Sexual Activity    Alcohol use: Yes     Alcohol/week: 6.0 standard drinks of alcohol     Types: 6 Shots of liquor per week    Drug use: No        Review of Systems:    Positive for stated complaint: sinus symptoms. .   Pertinent positives and negatives noted in the the HPI.    EXAM:   /76   Pulse 85   Temp 98.9 °F (37.2 °C) (Temporal)   Resp 18   Ht 5' 5\" (1.651 m)   Wt 148 lb (67.1 kg)   LMP 12/11/2024 (Approximate)   SpO2 97%   BMI 24.63 kg/m²   GENERAL: well developed, well nourished,in no apparent distress  SKIN: no rashes,no suspicious lesions  HEAD: atraumatic, normocephalic  EYES: conjunctiva clear, sclera white,  PERRLA  EARS: TM's non erythematous  NOSE: nares patent, mucosa mild congestion  THROAT: Posterior pharynx is mildy erythematous, tonsils absent, no exudate  NECK: supple, non-tender  LUNGS: clear to auscultation bilaterally without rale, ronchi, wheeze.  CARDIO: S1/S2 without murmur  EXTREMITIES: no cyanosis, clubbing or edema  LYMPH:  no gross lymphadenopathy.      No results found for this or any previous visit (from the past 24 hours).      ASSESSMENT AND PLAN:   Gabriela Rowland is a 44 year old female who presents with Sinus Problem (Sinus pain and congestion. Symptoms for a week /OTC: Mucinex Dm ). Symptoms are consistent with:      ASSESSMENT:  Encounter Diagnosis   Name Primary?    Acute non-recurrent maxillary sinusitis Yes         PLAN:     Symptomatic care:   1. Rest. Drink plenty of fluids.  2. Tylenol or ibuprofen for discomfort or fever.   3. OTC decongestant (phenylephrine) expectorants (guaifenesin), nasal steroid sprays  (fluticasone) may be helpful        for congestion.  4. OTC cough suppressant for cough  (dextromethorphan)  5.  Chloraseptic spray/throat lozenges for sore throat   6 augmentin as written  7 stop nasal decongestant.      Go to the ED for evaluation with progressive symptoms of difficulty swallowing, breathing, shortness of breath, chest pain, extreme weakness, or confusion.         Meds & Refills for this Visit:  Requested Prescriptions     Signed Prescriptions Disp Refills    amoxicillin clavulanate 875-125 MG Oral Tab 20 tablet 0     Sig: Take 1 tablet by mouth 2 (two) times daily for 10 days.       Risks, benefits, side effects of medication addressed and explained.    There are no Patient Instructions on file for this visit.    The patient indicates understanding of these issues and agrees to the plan.  The patient is asked to follow up PCP

## (undated) NOTE — MR AVS SNAPSHOT
[facility]  [address] [facility] [address]  [phone]               Thank you for choosing us for your health care visit with Oscar Flores MD.  We are glad to serve you and happy to provide you with this summary o · Ease other symptoms. Other medicines can help ease depression and anxiety caused by living with dizziness or fainting. Date Last Reviewed: 11/1/2016  © 0282-4712 The [address] [place] [address], [address] [place] [address]. All rights reserved. OCCULT BLOOD Negative Negative    PH, URINE 6.5 4.5 - 8.0    PROTEIN (URINE DIPSTICK) Negative Negative/Trace mg/dL    UROBILINOGEN,SEMI-QN 0.2 0.0 - 1.9 mg/dL    NITRITE, URINE Negative Negative    LEUKOCYTES Negative Negative    APPEARANCE Clear Clear

## (undated) NOTE — MR AVS SNAPSHOT
[facility]  [address] [facility] [address]  [phone]               Thank you for choosing us for your health care visit with Federica Hoskins MD.  We are glad to serve you and happy to provide you with this summary o · Anxiety  · Feeling depressed or helpless  · Difficulty concentrating  · Detachment or withdrawal from those around you  · Loss of interest in your normal life and work  Home care  · Allow yourself to feel the pain of your loss.  For some, this can be a ke · Unable to eat or sleep for three days in a row  · Feeling extreme depression, fear, anxiety, or anger toward yourself or others  · Feeling out of control  · Feeling that you may try to harm yourself  · family or friends express concern over your behavior · shortness of breath  · suicidal thoughts or other mood changes  · unusual bleeding or bruising  Side effects that usually do not require medical attention (report to your doctor or health care professional if they continue or are bothersome):  · blurred Where should I keep my medicine? Keep out of reach of children. Store at room temperature between 15 and 30 degrees C (59 and 86 degrees F). Throw away any unused medicine after the expiration date.   What should I tell my health care provider before I ta with the effect of this medicine. Avoid alcoholic drinks. Your mouth may get dry. Chewing sugarless gum or sucking hard candy, and drinking plenty of water will help. Contact your doctor if the problem does not go away or is severe.   Date Last Reviewed: Visit [facility] online at  [email or web address]

## (undated) NOTE — MR AVS SNAPSHOT
[facility]  [address] [facility] [address]  [phone]               Thank you for choosing us for your health care visit with Mabel Mendez MD.  We are glad to serve you and happy to provide you with this summary o The nipple is the outlet for milk during breastfeeding. Fibrous tissue supports your breasts, making them feel firm. Lobules (mammary glands) produce milk during pregnancy and breastfeeding. Ducts carry milk from the lobules during breastfeeding.   Fatty If you've recently had a stay at the Hospital you can access your discharge instructions in [facility] by going to Visits < Admission Summaries.  If you've been to the Emergency Department or your doctor's office, you can view your past visit information in My Visit [facility] online at  [email or web address]

## (undated) NOTE — LETTER
Date & Time: 3/16/2021, 9:54 AM  Patient: Corey Crespo  Encounter Provider(s):    CONCEPCION [place]       To Whom It May Concern:    King Adria was seen and treated in our department on 3/16/2021. Patient should not return to work at this time.  Pa

## (undated) NOTE — LETTER
02/21/20        Kristie Redd [id number]      Dear Erik Wesley records indicate that you have outstanding lab work and or testing that was ordered for you and has not yet been completed:  Orders Placed This Encounter

## (undated) NOTE — LETTER
01/03/19        Mateo Quijano [id number]            Dear Karthik Barth,    [address] [facility] records indicate that you are due for fasting blood work (lipid panel, CMP, Vit D, CBC)    Please call our office during normal business hours so that w